# Patient Record
Sex: MALE | Race: WHITE
[De-identification: names, ages, dates, MRNs, and addresses within clinical notes are randomized per-mention and may not be internally consistent; named-entity substitution may affect disease eponyms.]

---

## 2019-11-06 ENCOUNTER — HOSPITAL ENCOUNTER (EMERGENCY)
Dept: HOSPITAL 8 - ED | Age: 49
Discharge: HOME | End: 2019-11-06
Payer: MEDICAID

## 2019-11-06 VITALS — HEIGHT: 64 IN | WEIGHT: 112.88 LBS | BODY MASS INDEX: 19.27 KG/M2

## 2019-11-06 VITALS — SYSTOLIC BLOOD PRESSURE: 92 MMHG | DIASTOLIC BLOOD PRESSURE: 64 MMHG

## 2019-11-06 DIAGNOSIS — J20.8: Primary | ICD-10-CM

## 2019-11-06 DIAGNOSIS — F17.200: ICD-10-CM

## 2019-11-06 PROCEDURE — 94640 AIRWAY INHALATION TREATMENT: CPT

## 2019-11-06 PROCEDURE — 71045 X-RAY EXAM CHEST 1 VIEW: CPT

## 2019-11-06 PROCEDURE — 99283 EMERGENCY DEPT VISIT LOW MDM: CPT

## 2019-11-25 ENCOUNTER — HOSPITAL ENCOUNTER (EMERGENCY)
Dept: HOSPITAL 8 - ED | Age: 49
Discharge: HOME | End: 2019-11-25
Payer: MEDICAID

## 2019-11-25 VITALS — SYSTOLIC BLOOD PRESSURE: 120 MMHG | DIASTOLIC BLOOD PRESSURE: 70 MMHG

## 2019-11-25 VITALS — BODY MASS INDEX: 19.57 KG/M2 | WEIGHT: 114.64 LBS | HEIGHT: 64 IN

## 2019-11-25 DIAGNOSIS — J00: Primary | ICD-10-CM

## 2019-11-25 DIAGNOSIS — Z76.0: ICD-10-CM

## 2019-11-25 PROCEDURE — 99283 EMERGENCY DEPT VISIT LOW MDM: CPT

## 2019-12-14 ENCOUNTER — HOSPITAL ENCOUNTER (EMERGENCY)
Dept: HOSPITAL 8 - ED | Age: 49
Discharge: HOME | End: 2019-12-14
Payer: MEDICAID

## 2019-12-14 VITALS — HEIGHT: 65 IN | WEIGHT: 113.76 LBS | BODY MASS INDEX: 18.95 KG/M2

## 2019-12-14 VITALS — DIASTOLIC BLOOD PRESSURE: 75 MMHG | SYSTOLIC BLOOD PRESSURE: 105 MMHG

## 2019-12-14 DIAGNOSIS — F17.200: ICD-10-CM

## 2019-12-14 DIAGNOSIS — J41.1: Primary | ICD-10-CM

## 2019-12-14 DIAGNOSIS — R09.81: ICD-10-CM

## 2019-12-14 PROCEDURE — 94640 AIRWAY INHALATION TREATMENT: CPT

## 2019-12-14 PROCEDURE — 99283 EMERGENCY DEPT VISIT LOW MDM: CPT

## 2019-12-14 PROCEDURE — 71045 X-RAY EXAM CHEST 1 VIEW: CPT

## 2019-12-14 NOTE — NUR
PT REPORTS HAVING A COUGH FOR THE LAST TWO WKS, STATES HE WAS HERE AT THE 
HOSPITAL AND DISCHARGED WITH "COUGHING PILLS AND AN INHALER" HE THEN FELT 
BETTER FOR A SHORT WHILE AND HIS COUGH RETURNED THIS TIME WITH THICK GREEN 
SPUTUM. PT STATES TEMP MAX 99. "SOMETHING". 



PT TO CONT PULSE OX, BP MONITOR

## 2020-01-19 ENCOUNTER — HOSPITAL ENCOUNTER (INPATIENT)
Dept: HOSPITAL 8 - ED | Age: 50
LOS: 3 days | Discharge: LEFT BEFORE DISCHARGE | DRG: 149 | End: 2020-01-22
Attending: INTERNAL MEDICINE | Admitting: HOSPITALIST
Payer: MEDICAID

## 2020-01-19 VITALS — BODY MASS INDEX: 20.21 KG/M2 | WEIGHT: 118.39 LBS | HEIGHT: 64 IN

## 2020-01-19 DIAGNOSIS — K44.9: ICD-10-CM

## 2020-01-19 DIAGNOSIS — K22.8: ICD-10-CM

## 2020-01-19 DIAGNOSIS — F17.200: ICD-10-CM

## 2020-01-19 DIAGNOSIS — Z53.29: ICD-10-CM

## 2020-01-19 DIAGNOSIS — D64.9: ICD-10-CM

## 2020-01-19 DIAGNOSIS — R42: Primary | ICD-10-CM

## 2020-01-19 DIAGNOSIS — J44.9: ICD-10-CM

## 2020-01-19 LAB
ALBUMIN SERPL-MCNC: 3.2 G/DL (ref 3.4–5)
ALP SERPL-CCNC: 93 U/L (ref 45–117)
ALT SERPL-CCNC: 25 U/L (ref 12–78)
ANION GAP SERPL CALC-SCNC: 4 MMOL/L (ref 5–15)
BASOPHILS # BLD AUTO: 0.03 X10^3/UL (ref 0–0.1)
BASOPHILS NFR BLD AUTO: 0 % (ref 0–1)
BILIRUB SERPL-MCNC: 0.3 MG/DL (ref 0.2–1)
CALCIUM SERPL-MCNC: 8.8 MG/DL (ref 8.5–10.1)
CHLORIDE SERPL-SCNC: 104 MMOL/L (ref 98–107)
CREAT SERPL-MCNC: 0.83 MG/DL (ref 0.7–1.3)
EOSINOPHIL # BLD AUTO: 0.13 X10^3/UL (ref 0–0.4)
EOSINOPHIL NFR BLD AUTO: 1 % (ref 1–7)
ERYTHROCYTE [DISTWIDTH] IN BLOOD BY AUTOMATED COUNT: 14.9 % (ref 9.4–14.8)
ERYTHROCYTE [SEDIMENTATION RATE] IN BLOOD BY WESTERGREN METHOD: 28 MM/HR (ref 0–10)
HCT (SEDRATE): 40.1 % (ref 39.2–51.8)
LYMPHOCYTES # BLD AUTO: 1.16 X10^3/UL (ref 1–3.4)
LYMPHOCYTES NFR BLD AUTO: 12 % (ref 22–44)
MCH RBC QN AUTO: 29.5 PG (ref 27.5–34.5)
MCHC RBC AUTO-ENTMCNC: 33.2 G/DL (ref 33.2–36.2)
MCV RBC AUTO: 89.1 FL (ref 81–97)
MD: NO
MONOCYTES # BLD AUTO: 0.73 X10^3/UL (ref 0.2–0.8)
MONOCYTES NFR BLD AUTO: 8 % (ref 2–9)
NEUTROPHILS # BLD AUTO: 7.46 X10^3/UL (ref 1.8–6.8)
NEUTROPHILS NFR BLD AUTO: 78 % (ref 42–75)
PLATELET # BLD AUTO: 421 X10^3/UL (ref 130–400)
PMV BLD AUTO: 7.3 FL (ref 7.4–10.4)
PROT SERPL-MCNC: 7.7 G/DL (ref 6.4–8.2)
RBC # BLD AUTO: 4.5 X10^6/UL (ref 4.38–5.82)
TROPONIN I SERPL-MCNC: < 0.015 NG/ML (ref 0–0.04)
TROPONIN I SERPL-MCNC: < 0.015 NG/ML (ref 0–0.04)

## 2020-01-19 PROCEDURE — 36415 COLL VENOUS BLD VENIPUNCTURE: CPT

## 2020-01-19 PROCEDURE — 83540 ASSAY OF IRON: CPT

## 2020-01-19 PROCEDURE — 70498 CT ANGIOGRAPHY NECK: CPT

## 2020-01-19 PROCEDURE — 87040 BLOOD CULTURE FOR BACTERIA: CPT

## 2020-01-19 PROCEDURE — 99285 EMERGENCY DEPT VISIT HI MDM: CPT

## 2020-01-19 PROCEDURE — 86140 C-REACTIVE PROTEIN: CPT

## 2020-01-19 PROCEDURE — 93306 TTE W/DOPPLER COMPLETE: CPT

## 2020-01-19 PROCEDURE — 80048 BASIC METABOLIC PNL TOTAL CA: CPT

## 2020-01-19 PROCEDURE — 83880 ASSAY OF NATRIURETIC PEPTIDE: CPT

## 2020-01-19 PROCEDURE — 84100 ASSAY OF PHOSPHORUS: CPT

## 2020-01-19 PROCEDURE — 87806 HIV AG W/HIV1&2 ANTB W/OPTIC: CPT

## 2020-01-19 PROCEDURE — 85025 COMPLETE CBC W/AUTO DIFF WBC: CPT

## 2020-01-19 PROCEDURE — G0475 HIV COMBINATION ASSAY: HCPCS

## 2020-01-19 PROCEDURE — 70553 MRI BRAIN STEM W/O & W/DYE: CPT

## 2020-01-19 PROCEDURE — 84443 ASSAY THYROID STIM HORMONE: CPT

## 2020-01-19 PROCEDURE — A9575 INJ GADOTERATE MEGLUMI 0.1ML: HCPCS

## 2020-01-19 PROCEDURE — 83735 ASSAY OF MAGNESIUM: CPT

## 2020-01-19 PROCEDURE — 71045 X-RAY EXAM CHEST 1 VIEW: CPT

## 2020-01-19 PROCEDURE — 85651 RBC SED RATE NONAUTOMATED: CPT

## 2020-01-19 PROCEDURE — 84484 ASSAY OF TROPONIN QUANT: CPT

## 2020-01-19 PROCEDURE — 93005 ELECTROCARDIOGRAM TRACING: CPT

## 2020-01-19 PROCEDURE — 70450 CT HEAD/BRAIN W/O DYE: CPT

## 2020-01-19 PROCEDURE — 80307 DRUG TEST PRSMV CHEM ANLYZR: CPT

## 2020-01-19 PROCEDURE — 83550 IRON BINDING TEST: CPT

## 2020-01-19 PROCEDURE — 80053 COMPREHEN METABOLIC PANEL: CPT

## 2020-01-19 RX ADMIN — NICOTINE SCH PATCH: 14 PATCH, EXTENDED RELEASE TRANSDERMAL at 22:30

## 2020-01-19 NOTE — NUR
pt resting in [place] [facility] at this time. vss and updated in emr. pt medicated per 
mar. pt provided warm blanket per request. call light is within reach.

## 2020-01-19 NOTE — NUR
PRE BOLUS: SUPINE: HR 49 /85

                  SITTING: HR 49  /90

                  STANDING: HR 76 /91

## 2020-01-20 VITALS — DIASTOLIC BLOOD PRESSURE: 76 MMHG | SYSTOLIC BLOOD PRESSURE: 138 MMHG

## 2020-01-20 VITALS — SYSTOLIC BLOOD PRESSURE: 119 MMHG | DIASTOLIC BLOOD PRESSURE: 70 MMHG

## 2020-01-20 LAB
% IRON SATURATION: 50 % (ref 20–55)
ANION GAP SERPL CALC-SCNC: 6 MMOL/L (ref 5–15)
BASOPHILS # BLD AUTO: 0.06 X10^3/UL (ref 0–0.1)
BASOPHILS NFR BLD AUTO: 1 % (ref 0–1)
CALCIUM SERPL-MCNC: 8.3 MG/DL (ref 8.5–10.1)
CHLORIDE SERPL-SCNC: 106 MMOL/L (ref 98–107)
CREAT SERPL-MCNC: 0.85 MG/DL (ref 0.7–1.3)
CRP SERPL-MCNC: 0.67 MG/DL (ref 0.02–0.49)
EOSINOPHIL # BLD AUTO: 0.16 X10^3/UL (ref 0–0.4)
EOSINOPHIL NFR BLD AUTO: 2 % (ref 1–7)
ERYTHROCYTE [DISTWIDTH] IN BLOOD BY AUTOMATED COUNT: 14.6 % (ref 9.4–14.8)
IRON LEVEL: 179 MCG/DL (ref 65–175)
LYMPHOCYTES # BLD AUTO: 1.59 X10^3/UL (ref 1–3.4)
LYMPHOCYTES NFR BLD AUTO: 17 % (ref 22–44)
MCH RBC QN AUTO: 29.1 PG (ref 27.5–34.5)
MCHC RBC AUTO-ENTMCNC: 32.4 G/DL (ref 33.2–36.2)
MCV RBC AUTO: 90 FL (ref 81–97)
MD: NO
MONOCYTES # BLD AUTO: 0.95 X10^3/UL (ref 0.2–0.8)
MONOCYTES NFR BLD AUTO: 10 % (ref 2–9)
NEUTROPHILS # BLD AUTO: 6.58 X10^3/UL (ref 1.8–6.8)
NEUTROPHILS NFR BLD AUTO: 70 % (ref 42–75)
PLATELET # BLD AUTO: 409 X10^3/UL (ref 130–400)
PMV BLD AUTO: 7.3 FL (ref 7.4–10.4)
RBC # BLD AUTO: 4.54 X10^6/UL (ref 4.38–5.82)
TIBC SERPL-MCNC: 355 MCG/DL (ref 250–450)
TROPONIN I SERPL-MCNC: < 0.015 NG/ML (ref 0–0.04)

## 2020-01-20 RX ADMIN — KETOROLAC TROMETHAMINE SCH MG: 30 INJECTION, SOLUTION INTRAMUSCULAR at 15:54

## 2020-01-20 RX ADMIN — IBUPROFEN PRN MG: 600 TABLET ORAL at 17:30

## 2020-01-20 RX ADMIN — ACETAMINOPHEN PRN MG: 325 TABLET, FILM COATED ORAL at 15:59

## 2020-01-20 RX ADMIN — IBUPROFEN PRN MG: 600 TABLET ORAL at 12:32

## 2020-01-20 RX ADMIN — ACETAMINOPHEN PRN MG: 325 TABLET, FILM COATED ORAL at 12:51

## 2020-01-20 RX ADMIN — HEPARIN SODIUM SCH UNITS: 5000 INJECTION, SOLUTION INTRAVENOUS; SUBCUTANEOUS at 16:09

## 2020-01-20 RX ADMIN — KETOROLAC TROMETHAMINE SCH MG: 30 INJECTION, SOLUTION INTRAMUSCULAR at 09:43

## 2020-01-20 RX ADMIN — NICOTINE SCH PATCH: 14 PATCH, EXTENDED RELEASE TRANSDERMAL at 21:35

## 2020-01-20 RX ADMIN — HEPARIN SODIUM SCH UNITS: 5000 INJECTION, SOLUTION INTRAVENOUS; SUBCUTANEOUS at 08:53

## 2020-01-20 RX ADMIN — KETOROLAC TROMETHAMINE SCH MG: 30 INJECTION, SOLUTION INTRAMUSCULAR at 21:34

## 2020-01-20 NOTE — NUR
BREAK RN FOR PRIMARY RN DIPESH, RECEIVED REPORT, CARE ASSUMED. PT RESTING IN 
POSITION OF COMFORT WATCHING TV AND PLAYING ON CELL PHONE. PT REPORTS 10/10 HA, 
REQUESTING PAIN MEDICATION. TO REVIEW ORDERS AND MEDICATE PER ADMIT MD. DENIES 
NEED TO USE RESTROOM. NEURO AND CMS INTACT. A&OX4. VSS. SR ON MONITOR. CALL 
LIGHT IN REACH. FALL PRECAUTIONS IN PLACE. SIDE RAILS UP. REMAINS TELE HOLD, 
AWAITING ROOM ASSIGNMENT ON FLOOR.

## 2020-01-20 NOTE — NUR
-------------------------------------------------------------------------------

           *** Note undone in [facility] - 01/20/20 at 1019 by ROSEMARY ***            

-------------------------------------------------------------------------------

PT BACK FROM CT.

## 2020-01-20 NOTE — NUR
REPORT RECEIVED FROM GLORIA SON. CARE ASSUMED. PT ASLEEP ON HOSPITAL BED, ON FULL 
MONITOR, EVEN CHEST RISE AND FALL, NAD, CALL LIGHT WITHIN REACH.

## 2020-01-21 VITALS — DIASTOLIC BLOOD PRESSURE: 95 MMHG | SYSTOLIC BLOOD PRESSURE: 179 MMHG

## 2020-01-21 VITALS — DIASTOLIC BLOOD PRESSURE: 89 MMHG | SYSTOLIC BLOOD PRESSURE: 133 MMHG

## 2020-01-21 VITALS — SYSTOLIC BLOOD PRESSURE: 156 MMHG | DIASTOLIC BLOOD PRESSURE: 86 MMHG

## 2020-01-21 VITALS — DIASTOLIC BLOOD PRESSURE: 86 MMHG | SYSTOLIC BLOOD PRESSURE: 161 MMHG

## 2020-01-21 VITALS — SYSTOLIC BLOOD PRESSURE: 138 MMHG | DIASTOLIC BLOOD PRESSURE: 76 MMHG

## 2020-01-21 VITALS — SYSTOLIC BLOOD PRESSURE: 141 MMHG | DIASTOLIC BLOOD PRESSURE: 93 MMHG

## 2020-01-21 VITALS — DIASTOLIC BLOOD PRESSURE: 85 MMHG | SYSTOLIC BLOOD PRESSURE: 173 MMHG

## 2020-01-21 LAB
ANION GAP SERPL CALC-SCNC: 6 MMOL/L (ref 5–15)
BASOPHILS # BLD AUTO: 0.1 X10^3/UL (ref 0–0.1)
BASOPHILS NFR BLD AUTO: 1 % (ref 0–1)
CALCIUM SERPL-MCNC: 8.2 MG/DL (ref 8.5–10.1)
CHLORIDE SERPL-SCNC: 106 MMOL/L (ref 98–107)
CREAT SERPL-MCNC: 0.81 MG/DL (ref 0.7–1.3)
EOSINOPHIL # BLD AUTO: 0.11 X10^3/UL (ref 0–0.4)
EOSINOPHIL NFR BLD AUTO: 1 % (ref 1–7)
ERYTHROCYTE [DISTWIDTH] IN BLOOD BY AUTOMATED COUNT: 14.5 % (ref 9.4–14.8)
LYMPHOCYTES # BLD AUTO: 1.64 X10^3/UL (ref 1–3.4)
LYMPHOCYTES NFR BLD AUTO: 15 % (ref 22–44)
MCH RBC QN AUTO: 29.4 PG (ref 27.5–34.5)
MCHC RBC AUTO-ENTMCNC: 32.6 G/DL (ref 33.2–36.2)
MCV RBC AUTO: 90.1 FL (ref 81–97)
MD: NO
MONOCYTES # BLD AUTO: 0.8 X10^3/UL (ref 0.2–0.8)
MONOCYTES NFR BLD AUTO: 8 % (ref 2–9)
NEUTROPHILS # BLD AUTO: 8.11 X10^3/UL (ref 1.8–6.8)
NEUTROPHILS NFR BLD AUTO: 75 % (ref 42–75)
PLATELET # BLD AUTO: 451 X10^3/UL (ref 130–400)
PMV BLD AUTO: 7.4 FL (ref 7.4–10.4)
RBC # BLD AUTO: 4.48 X10^6/UL (ref 4.38–5.82)

## 2020-01-21 RX ADMIN — NICOTINE SCH PATCH: 14 PATCH, EXTENDED RELEASE TRANSDERMAL at 20:21

## 2020-01-21 RX ADMIN — KETOROLAC TROMETHAMINE SCH MG: 30 INJECTION, SOLUTION INTRAMUSCULAR at 01:48

## 2020-01-21 RX ADMIN — HEPARIN SODIUM SCH UNITS: 5000 INJECTION, SOLUTION INTRAVENOUS; SUBCUTANEOUS at 08:55

## 2020-01-21 RX ADMIN — ACETAMINOPHEN PRN MG: 325 TABLET, FILM COATED ORAL at 00:31

## 2020-01-21 RX ADMIN — HEPARIN SODIUM SCH UNITS: 5000 INJECTION, SOLUTION INTRAVENOUS; SUBCUTANEOUS at 00:30

## 2020-01-21 RX ADMIN — KETOROLAC TROMETHAMINE SCH MG: 30 INJECTION, SOLUTION INTRAMUSCULAR at 08:51

## 2020-01-21 RX ADMIN — SODIUM CHLORIDE SCH MLS/HR: 0.9 INJECTION, SOLUTION INTRAVENOUS at 20:11

## 2020-01-21 RX ADMIN — HEPARIN SODIUM SCH UNITS: 5000 INJECTION, SOLUTION INTRAVENOUS; SUBCUTANEOUS at 01:48

## 2020-01-21 RX ADMIN — ENALAPRILAT PRN MG: 1.25 INJECTION, SOLUTION INTRAVENOUS at 00:48

## 2020-01-21 RX ADMIN — HEPARIN SODIUM SCH UNITS: 5000 INJECTION, SOLUTION INTRAVENOUS; SUBCUTANEOUS at 20:11

## 2020-01-21 RX ADMIN — ENALAPRILAT PRN MG: 1.25 INJECTION, SOLUTION INTRAVENOUS at 01:25

## 2020-01-21 RX ADMIN — KETOROLAC TROMETHAMINE SCH MG: 30 INJECTION, SOLUTION INTRAMUSCULAR at 20:12

## 2020-01-21 RX ADMIN — IBUPROFEN PRN MG: 600 TABLET ORAL at 08:57

## 2020-01-21 RX ADMIN — KETOROLAC TROMETHAMINE SCH MG: 30 INJECTION, SOLUTION INTRAMUSCULAR at 14:50

## 2020-01-22 VITALS — SYSTOLIC BLOOD PRESSURE: 108 MMHG | DIASTOLIC BLOOD PRESSURE: 68 MMHG

## 2020-01-22 VITALS — DIASTOLIC BLOOD PRESSURE: 83 MMHG | SYSTOLIC BLOOD PRESSURE: 133 MMHG

## 2020-01-22 LAB
BASOPHILS # BLD AUTO: 0.05 X10^3/UL (ref 0–0.1)
BASOPHILS NFR BLD AUTO: 1 % (ref 0–1)
EOSINOPHIL # BLD AUTO: 0.14 X10^3/UL (ref 0–0.4)
EOSINOPHIL NFR BLD AUTO: 2 % (ref 1–7)
ERYTHROCYTE [DISTWIDTH] IN BLOOD BY AUTOMATED COUNT: 14.6 % (ref 9.4–14.8)
LYMPHOCYTES # BLD AUTO: 1.46 X10^3/UL (ref 1–3.4)
LYMPHOCYTES NFR BLD AUTO: 21 % (ref 22–44)
MCH RBC QN AUTO: 29.8 PG (ref 27.5–34.5)
MCHC RBC AUTO-ENTMCNC: 32.9 G/DL (ref 33.2–36.2)
MCV RBC AUTO: 90.7 FL (ref 81–97)
MD: NO
MONOCYTES # BLD AUTO: 0.84 X10^3/UL (ref 0.2–0.8)
MONOCYTES NFR BLD AUTO: 12 % (ref 2–9)
NEUTROPHILS # BLD AUTO: 4.59 X10^3/UL (ref 1.8–6.8)
NEUTROPHILS NFR BLD AUTO: 65 % (ref 42–75)
PLATELET # BLD AUTO: 364 X10^3/UL (ref 130–400)
PMV BLD AUTO: 7.5 FL (ref 7.4–10.4)
RBC # BLD AUTO: 4.18 X10^6/UL (ref 4.38–5.82)

## 2020-01-22 RX ADMIN — KETOROLAC TROMETHAMINE SCH MG: 30 INJECTION, SOLUTION INTRAMUSCULAR at 09:31

## 2020-01-22 RX ADMIN — SODIUM CHLORIDE SCH MLS/HR: 0.9 INJECTION, SOLUTION INTRAVENOUS at 06:05

## 2020-01-22 RX ADMIN — KETOROLAC TROMETHAMINE SCH MG: 30 INJECTION, SOLUTION INTRAMUSCULAR at 02:32

## 2020-01-22 RX ADMIN — HEPARIN SODIUM SCH UNITS: 5000 INJECTION, SOLUTION INTRAVENOUS; SUBCUTANEOUS at 05:06

## 2020-04-18 ENCOUNTER — HOSPITAL ENCOUNTER (EMERGENCY)
Dept: HOSPITAL 8 - ED | Age: 50
LOS: 1 days | Discharge: HOME | End: 2020-04-19
Payer: MEDICAID

## 2020-04-18 VITALS — BODY MASS INDEX: 17.31 KG/M2 | HEIGHT: 64 IN | WEIGHT: 101.41 LBS

## 2020-04-18 VITALS — SYSTOLIC BLOOD PRESSURE: 129 MMHG | DIASTOLIC BLOOD PRESSURE: 90 MMHG

## 2020-04-18 DIAGNOSIS — F17.210: ICD-10-CM

## 2020-04-18 DIAGNOSIS — J44.1: Primary | ICD-10-CM

## 2020-04-18 DIAGNOSIS — R05: ICD-10-CM

## 2020-04-18 PROCEDURE — 99283 EMERGENCY DEPT VISIT LOW MDM: CPT

## 2020-04-18 PROCEDURE — 71045 X-RAY EXAM CHEST 1 VIEW: CPT

## 2020-04-18 NOTE — NUR
THIS IS A 49Y M THAT COMES IN TONIGHT FOR COUGH X5-6 DAYS AFTER NOT WEARING 
RESPIRATOR AT WORK. PT STS HE NEEDS TO BE WEARING A RESPIRATOR WHEN HANDLING 
THE FINE POWDER BEFORE WATER IS ADDED TO THE CONCRETE MIXTURE. PT RESTING ON 
YASMANY NADSHAUN SPEAKING IN FULL SENTENCES SATS REMAIN %

## 2020-09-22 ENCOUNTER — HOSPITAL ENCOUNTER (EMERGENCY)
Dept: HOSPITAL 8 - ED | Age: 50
Discharge: HOME | End: 2020-09-22
Payer: SELF-PAY

## 2020-09-22 VITALS — SYSTOLIC BLOOD PRESSURE: 113 MMHG | DIASTOLIC BLOOD PRESSURE: 78 MMHG

## 2020-09-22 VITALS — BODY MASS INDEX: 18.52 KG/M2 | HEIGHT: 64 IN | WEIGHT: 108.47 LBS

## 2020-09-22 DIAGNOSIS — J44.9: ICD-10-CM

## 2020-09-22 DIAGNOSIS — A54.9: Primary | ICD-10-CM

## 2020-09-22 DIAGNOSIS — A55: ICD-10-CM

## 2020-09-22 DIAGNOSIS — I10: ICD-10-CM

## 2020-09-22 LAB — MICROSCOPIC: (no result)

## 2020-09-22 PROCEDURE — 87591 N.GONORRHOEAE DNA AMP PROB: CPT

## 2020-09-22 PROCEDURE — 99283 EMERGENCY DEPT VISIT LOW MDM: CPT

## 2020-09-22 PROCEDURE — 81003 URINALYSIS AUTO W/O SCOPE: CPT

## 2020-09-22 PROCEDURE — 87491 CHLMYD TRACH DNA AMP PROBE: CPT

## 2020-12-19 ENCOUNTER — HOSPITAL ENCOUNTER (EMERGENCY)
Dept: HOSPITAL 8 - ED | Age: 50
Discharge: HOME | End: 2020-12-19
Payer: MEDICAID

## 2020-12-19 VITALS — SYSTOLIC BLOOD PRESSURE: 124 MMHG | DIASTOLIC BLOOD PRESSURE: 59 MMHG

## 2020-12-19 VITALS — BODY MASS INDEX: 20.66 KG/M2 | WEIGHT: 121.03 LBS | HEIGHT: 64 IN

## 2020-12-19 DIAGNOSIS — J15.9: Primary | ICD-10-CM

## 2020-12-19 DIAGNOSIS — J44.9: ICD-10-CM

## 2020-12-19 DIAGNOSIS — Z20.828: ICD-10-CM

## 2020-12-19 PROCEDURE — 71045 X-RAY EXAM CHEST 1 VIEW: CPT

## 2020-12-19 PROCEDURE — 99284 EMERGENCY DEPT VISIT MOD MDM: CPT

## 2020-12-19 PROCEDURE — 87635 SARS-COV-2 COVID-19 AMP PRB: CPT

## 2021-01-01 ENCOUNTER — HOSPITAL ENCOUNTER (EMERGENCY)
Dept: HOSPITAL 8 - ED | Age: 51
Discharge: LEFT BEFORE DISCHARGE | End: 2021-01-01
Payer: MEDICAID

## 2021-01-01 DIAGNOSIS — R69: Primary | ICD-10-CM

## 2021-01-01 DIAGNOSIS — Z53.21: ICD-10-CM

## 2021-03-29 ENCOUNTER — HOSPITAL ENCOUNTER (EMERGENCY)
Dept: HOSPITAL 8 - ED | Age: 51
LOS: 1 days | Discharge: HOME | End: 2021-03-30
Payer: MEDICAID

## 2021-03-29 VITALS — HEIGHT: 63 IN | BODY MASS INDEX: 20.78 KG/M2 | WEIGHT: 117.29 LBS

## 2021-03-29 VITALS — SYSTOLIC BLOOD PRESSURE: 120 MMHG | DIASTOLIC BLOOD PRESSURE: 69 MMHG

## 2021-03-29 DIAGNOSIS — M10.072: Primary | ICD-10-CM

## 2021-03-29 DIAGNOSIS — K43.9: ICD-10-CM

## 2021-03-29 LAB
ALBUMIN SERPL-MCNC: 3.4 G/DL (ref 3.4–5)
ANION GAP SERPL CALC-SCNC: 2 MMOL/L (ref 5–15)
BASOPHILS # BLD AUTO: 0.1 X10^3/UL (ref 0–0.1)
BASOPHILS NFR BLD AUTO: 1 % (ref 0–1)
CALCIUM SERPL-MCNC: 8.7 MG/DL (ref 8.5–10.1)
CHLORIDE SERPL-SCNC: 107 MMOL/L (ref 98–107)
CREAT SERPL-MCNC: 0.95 MG/DL (ref 0.7–1.3)
EOSINOPHIL # BLD AUTO: 0.2 X10^3/UL (ref 0–0.4)
EOSINOPHIL NFR BLD AUTO: 2 % (ref 1–7)
ERYTHROCYTE [DISTWIDTH] IN BLOOD BY AUTOMATED COUNT: 16.3 % (ref 9.4–14.8)
ERYTHROCYTE [SEDIMENTATION RATE] IN BLOOD BY WESTERGREN METHOD: 18 MM/HR (ref 0–10)
HCT (SEDRATE): 35.2 % (ref 39.2–51.8)
LYMPHOCYTES # BLD AUTO: 1.8 X10^3/UL (ref 1–3.4)
LYMPHOCYTES NFR BLD AUTO: 16 % (ref 22–44)
MCH RBC QN AUTO: 26.8 PG (ref 27.5–34.5)
MCHC RBC AUTO-ENTMCNC: 31.9 G/DL (ref 33.2–36.2)
MD: NO
MONOCYTES # BLD AUTO: 1.1 X10^3/UL (ref 0.2–0.8)
MONOCYTES NFR BLD AUTO: 10 % (ref 2–9)
NEUTROPHILS # BLD AUTO: 8 X10^3/UL (ref 1.8–6.8)
NEUTROPHILS NFR BLD AUTO: 71 % (ref 42–75)
PLATELET # BLD AUTO: 363 X10^3/UL (ref 130–400)
PMV BLD AUTO: 7.2 FL (ref 7.4–10.4)
RBC # BLD AUTO: 4.33 X10^6/UL (ref 4.38–5.82)

## 2021-03-29 PROCEDURE — 85025 COMPLETE CBC W/AUTO DIFF WBC: CPT

## 2021-03-29 PROCEDURE — 36415 COLL VENOUS BLD VENIPUNCTURE: CPT

## 2021-03-29 PROCEDURE — 84550 ASSAY OF BLOOD/URIC ACID: CPT

## 2021-03-29 PROCEDURE — 85651 RBC SED RATE NONAUTOMATED: CPT

## 2021-03-29 PROCEDURE — 99284 EMERGENCY DEPT VISIT MOD MDM: CPT

## 2021-03-29 PROCEDURE — 82040 ASSAY OF SERUM ALBUMIN: CPT

## 2021-03-29 PROCEDURE — 80048 BASIC METABOLIC PNL TOTAL CA: CPT

## 2021-03-29 NOTE — NUR
Pt states his hernia is sticking out and is larger than normal. Pt also states 
that as he was walking to the ER his left big toe started hurting. Pt denies 
trauma to toe. Foot is red and swollen. 

Pt with obvious large hernia to abd.

## 2021-03-29 NOTE — NUR
Pt medicated per order. Another warm blanket given per pt request. Labs sent. 
xray completed. Will monitor.

## 2021-03-29 NOTE — NUR
ASSUMED CARE OF PATIENT. REPORT GIVEN FROM GLORIA MELLO

PT RESTING IN ROOM. NO ACUTE DISTRESS NOTED. VS STABLE. CALL LIGHT IN PLACE. PT 
GIVEN A WARM BLANKET. WILL CONTINUE TO MONITOR.

## 2021-03-29 NOTE — NUR
PT IS A&O X4. PT REPORTS HE IS ABLE TO GET HIS MEDICATIONS FILLED. VS STABLE. 
NO ACUTE DISTRESS. PT HAS APPROPRIATE CLOTHING FOR THE WEATHER. PT IS ABLE TO 
SAFELY AMBUALTE. TAXI VOUCHER GIVEN.